# Patient Record
Sex: FEMALE | Race: WHITE | Employment: OTHER | URBAN - METROPOLITAN AREA
[De-identification: names, ages, dates, MRNs, and addresses within clinical notes are randomized per-mention and may not be internally consistent; named-entity substitution may affect disease eponyms.]

---

## 2017-09-05 ENCOUNTER — HOSPITAL ENCOUNTER (OUTPATIENT)
Dept: PHYSICAL THERAPY | Age: 72
Discharge: HOME | End: 2017-09-05
Payer: MEDICARE

## 2017-09-05 PROCEDURE — 97162 PT EVAL MOD COMPLEX 30 MIN: CPT

## 2017-09-05 PROCEDURE — G8979 MOBILITY GOAL STATUS: HCPCS

## 2017-09-05 PROCEDURE — 97110 THERAPEUTIC EXERCISES: CPT

## 2017-09-05 PROCEDURE — G8978 MOBILITY CURRENT STATUS: HCPCS

## 2017-09-05 NOTE — PROGRESS NOTES
PT NEURO EVAL AND TREATMENT    Patient Name: Hoang Saunders  Date:2017  : 1945  [x]  Patient  Verified  Payor: Dustin Coronel / Plan: [place] B / Product Type: Medicare /    In time:1250  Out time:150  Total Treatment Time (min): 60  Total Timed Codes (min): 30  1:1 Treatment Time ([facility] only): 60   Visit #: 1 of 10    Treatment Area: Multiple sclerosis [G35]    SUBJECTIVE  Pain Level (0-10 on visual analog scale): 0  Any medication changes, allergies to medications, diagnosis change, or new procedure performed: see summary sheet for update  Subjective functional status/changes  CHIEF COMPLAINT: Pt c/o LLE weakness and balance deficits secondary to MS.   DEFICITS: difficulty walking without assist; sit-stands  OBJECTIVE:   Posture/Observations: flexed trunk, hips, knees; right trunk lean; dependent rubor right foot; BLE genu valgum  Palpation warmth to touch right foot/digits   Gait: antalgic with RLE trunk lean and left LE AFO/SBQC on level surface with discontinuous steps     AROM/PROM BLEs grossly WFLs except for LLE AROM DF which was -5 degrees    ROM:                             AROM    PROM   Shoulder Left Right Left Right   Flex       Ext       ABD       ER       IR                AROM    PROM   Knee Left Right Left Right   Ext       Flex                 AROM                           PROM  Hip Left Right Left Right   Flex       Ext       ABD       ER       IR                                                AROM      PROM   Ankle Left Right Left Right   Ext       Flex         Strength (MMT):  Shoulder L (1-5) R (1-5)   Shoulder Flexion     Shoulder Ext     Shoulder ABD     Shoulder ADD     Shoulder IR     Shoulder ER                                               Hip L (1-5) R (1-5)   Hip Flexion 3+ 4-   Hip Ext 3- 3-   Hip ABD 3+ 4-   Hip ADD     Hip ER     Hip IR       Knee L (1-5) R (1-5)   Knee Flexion 3+ 3+   Knee Extension 4- 4-   Ankle PF 4 4+   Ankle DF 3- 4-   Other Sensation: grossly intact throughout BLEs  Light Touch  Proprioception    Reflexes: unable to assess Babinski secondary to \"ticklish\"   Left Right   Biceps (C5)     Brachioradiais (C6)     Triceps (C7)     Knee Jerk (L4)     Ankle Jerk (SI)       Balance/ Equilibrium:         Sitting Balance: Static:  [x] Good    [] Fair    [] Poor     Dynamic:   [x] Good    [] Fair    [] Poor        Standing Balance: Static:   [] Good    [x] Fair    [] Poor     Dynamic:   [] Good    [x] Fair    [] Poor        Protective Extension:  [] Present    [x] Delayed    [] Absent        Single Leg Stance:    Unable to determine    Functional Mobility      Bed Mobility:      Scooting:        Rolling:       Sit-Supine:      Transfers:       Sit-Stand: 19in surface takes 2 attempts with BUE       Floor-Stand:       Gait:       Tandem:       Backwards:       Braiding:      Elevations:       Curbs:      Ramps:      Stairs:  Behavior: [] Cooperative    [] Impulsive    [] Agitated    [] Perseverative    [] Confused   Oriented x:3    Cognition: [] One Step Commands   [] Multiple Commands   [] Displays Neglect [] R  [] L  Optional Tests:        Dynamic Gait Index (24pt scale):        Functional Gait Assessment (30pt scale):       Amos Balance Scale (56pt scale):        Timed Up and Go:       TINETTI for Gait and Balance: 16/28    Other test /comments: ABC balance score 26%    Modality (rationale):   []  E-Stim: type _ x _ min     []att   []unatt   []w/US   []w/ice   []w/heat  []  Traction: []cerv   []pelvic   _ lbs x _ min     []pro   []sup   []int   []const  []  Ultrasound: []cont   []pulse    _ W/cm2 x _  min   []1MHz   []3MHz  []  Iontophoresis: []take home patch w/ dexamethazone    []40mA   []80mA                               []_ mA min w/: []dexamethazone   []other:_  []  Ice pack _  min     [] Hot pack _  min     [] Paraffin _  min  []  Other:     Patient Education: [x] Established HEP / POT  (minutes) : 30min HEP administered    Pain Level (0-10 scale) post treatment: 0    ASSESSMENT  [x]  See Plan of Care    PLAN  [x]  Upgrade activities as tolerated     [x] Other:_    See POC for Frequency/duration of visits     Justification for Eval Code Complexity:   Patient History : lives with  in 2 story home with 4 CRISTIANA; MS; OA; depression; L-S surgeryx2  Examination: (see exam)  Clinical Presentation: fluctuating  Clinical Decision Making : FOTO : 36 /100     G-Codes (GP): Mobility:  Current  CL= 60-79%   Goal  CK= 40-59%. The severity rating is based on the [address] Jose Luis Adams, PT 9/5/2017  2:31 PM

## 2017-09-05 NOTE — PROGRESS NOTES
[address] [facility] AT [facility]  [address] [place] [address],[address], Rayna almanza, April 71  Phone [phone]  Fax [phone] / [address] [address] PHYSICAL THERAPY SERVICES  Patient Name: Hoang Saunders : 1945   Medical   Diagnosis: Multiple sclerosis [G35] Treatment Diagnosis: Muscle weakness   Onset Date:      Referral Source: Megan Dye MD [facility]): 2017   Prior Hospitalization: See medical history Provider #: [id number]   Prior Level of Function: Able to get off a chair easier; able to ambulate more easily with use of AFO and SBQC   Comorbidities: MS; latex allergy; OA; depression; CTS and resting hand splint on right hand   Medications: Verified on Patient Summary List   The Plan of Care and following information is based on the information from the initial evaluation.   ===========================================================================================  Assessment / key information:  Patient is a 81 y.o. female who presents to [facility] with a diagnosis of Multiple sclerosis [G35]. Pt presents with her  and ambulates with LLE AFO and SBQC. She has a history of falls the most recent being 6-12 months ago. Pt said her weak side is the left but she tends to fall backwards. She uses a SBQC in the home to ambulate and in the community. In addition, she uses a rollator walker in the community when ambulating long distances. She is her own historian and said she follows up with the neurologist regularly. Her AFO \"is not that old\". Pt denies it cutting into her skin/rashes/irritation with use of brace. She is unable to recall when she purchased it. She dons LLE compression stocking. She lives in a 2 story home with her  with 4 CRISTIANA. She negotiates them with step to pattern and Bilateral UE assist on the right railing ascending and left descending.  She has a tub and tub bench, grab bars, raised toilet, and her master bedroom is on the first floor. AROM throughout BLEs grossly intact except for LLE active DF which is -5 degrees and 0 degrees passively. + RLE dependent rubor (pt was instructed to follow up with PCP and possible cardiologist regarding such). Postural impairments are as follows: BLE genu valgum and flexed trunk with right side lean. + dysmetria and dysdiadochokinesia BLEs. BLEs grossly intact to proprioception and light touch.+ Romberg. TINETTI 16/28 and ABC balance is 26% indicating high risk of falls. + 90-90BLE and +FABERs BLE. Pt unable to stand from a 19in surface without 2+ attempts and BUE support. Functional Deficits include: difficulty getting off chairs; ambulating any length of distance \"in the open without furniture\". Patient's FOTO score was a 36/100 indicating decreased function. Patient's Goal: get off a chair better and walk better. A HEP was initiated to assist with POC in restoring function.  Patient will benefit from a POC addressing such impairments and limitations in order to improve quality of life and return to PLOF.    ==================================================================================Eval Complexity: History: HIGH Complexity :3+ comorbidities / personal factors will impact the outcome/ POC Exam:HIGH Complexity : 4+ Standardized tests and measures addressing body structure, function, activity limitation and / or participation in recreation  Presentation: MEDIUM Complexity : Evolving with changing characteristics  Clinical Decision Making:MEDIUM Complexity : FOTO score of 26-74Overall Complexity:MEDIUM  Problem List: pain affecting function, decrease ROM, decrease strength, impaired gait/ balance, decrease ADL/ functional abilitiies, decrease activity tolerance, decrease flexibility/ joint mobility and decrease transfer abilities   Treatment Plan may include any combination of the following: Therapeutic exercise, Therapeutic activities, Neuromuscular re-education, Physical agent/modality, Gait/balance training, Manual therapy, Patient education, Self Care training, Functional mobility training, Home safety training and Stair training  Patient / Family readiness to learn indicated by: asking questions, trying to perform skills and interest  Persons(s) to be included in education: patient (P)  Barriers to Learning/Limitations: None  Measures taken:    Patient Goal (s): Get off a chair better and walk better   Patient self reported health status: fair  Rehabilitation Potential: good   Short Term Goals: To be accomplished in  2  treatments:  1. Pt will be compliant with HEP for symptom management at home.  Long Term Goals: To be accomplished in  10  treatments:  1. Pt will demonstrate an increased FOTO score to 50/100 in order to improve function  2. Pt will be independent with HEP at D/C for self management. 3. Pt will be able to negotiate 4, 6in stairs with step to gait with 2 railings and without LOB in order to safely enter/exit her home  4. Pt will be able to ambulate 5min continuously with use of LLE AFO/SBQC and continuous gait without LOB in order to negotiate her home and community with increased ease  5. Pt will be able to stand from a 19in surface with use of unilateral UE support on one try in order to improve function  6. Pt will improve TINETTI score to >18/28 in order to decrease risk of future falls  7. Pt will improve ABC balance score by 10% in order to decrease future risk of falls     Frequency / Duration:     Patient to be seen  2  times per week for 10  treatments:  Patient / Caregiver education and instruction: activity modification and exercises  G-Codes (GP): Mobility: [id number] Current  CL= 60-79%   Goal  CK= 40-59%. The severity rating is based on the FOTO Score    Therapist Signature: Anabella Marrero PT Date: 3/3/1873   Certification Period: 90 days Time: 2:47 PM ===========================================================================================  I certify that the above Physical Therapy Services are being furnished while the patient is under my care. I agree with the treatment plan and certify that this therapy is necessary. Physician Signature:        Date:       Time:     Please sign and return to [facility] or you may fax the signed copy to [phone]. Thank you.

## 2017-09-13 ENCOUNTER — HOSPITAL ENCOUNTER (OUTPATIENT)
Dept: PHYSICAL THERAPY | Age: 72
Discharge: HOME | End: 2017-09-13
Payer: MEDICARE

## 2017-09-13 PROCEDURE — 97110 THERAPEUTIC EXERCISES: CPT

## 2017-09-13 PROCEDURE — 97140 MANUAL THERAPY 1/> REGIONS: CPT

## 2017-09-13 NOTE — PROGRESS NOTES
PT DAILY TREATMENT NOTE - [facility]     Patient Name: Josue Guillen  Date:2017  : 1945  [x]  Patient  Verified  Payor: Flori Holley / Plan: [place] MEDICARE PART B / Product Type: Medicare /    In time: 1:30  Out time: 2:13  Total Treatment Time (min): 43  Total Timed Codes (min): 43  1:1 Treatment Time ( [address] only): 43   Visit #: 2 of 10    Treatment Area: Multiple sclerosis [G35], Muscle weakness [M62.81]    SUBJECTIVE  Pain Level (0-10 scale): 0  Any medication changes, allergies to medications, adverse drug reactions, diagnosis change, or new procedure performed?: [x] No    [] Yes (see summary sheet for update)  Subjective functional status/changes:   [] No changes reported  Pt reports transient b/l knee pain as per baseline. States she has been trying to do her HEP, but having trouble remembering to get them done. OBJECTIVE    35 min Therapeutic Exercise:  [x] See flow sheet :   Rationale: increase ROM, increase strength, improve balance and increase proprioception to improve the patients ability to perform functional mobility/ADLs and attain goals. 8 min Manual Therapy:  STM to right quad, ITB. Gentle patellar mobs grade 1-2   Rationale: to decrease pain and increase soft tissue extensibility  to improve the patients ability to perform functional mobility/ADLs and attain goals. x min Gait Training:  Ambulates in clinic `50'x2 with supervision and R SBQC during session. Ambulates 15' outdoors clinic to car with supervision. Rationale: With   [] TE   [] TA   [] neuro   [] other: Patient Education: [x] Review HEP    [] Progressed/Changed HEP based on:   [] positioning   [] body mechanics   [] transfers   [] heat/ice application    [] other:      Other Objective/Functional Measures:   -new exercises added as per flow sheet with vc's provided for form/technique 100% of the time.       Pain Level (0-10 scale) post treatment: 0    ASSESSMENT/Changes in Function: Pt with moderate fear of fall with ambulation and sit to stand transfers; \"I just need you to walk with me b/c I'm scared I'll fall\". Pt ed and cued for increased fwd wt shifting during sit to stand transfer to reduce muscle fatigue and strain which she demonstrates well, however glute weakness as per dynamic genu valgus from high mat table. Pt ed on need to exercise within tolerance and to avoid over-exertion/fatigue during exercises due to MS; she verbalizes understanding and agreement. R>L anterior knee pain a barrier to exercise tolerance today and addressed with manual therapy. Patient will continue to benefit from skilled PT services to modify and progress therapeutic interventions, address functional mobility deficits, address ROM deficits, address strength deficits, analyze and address soft tissue restrictions and analyze and cue movement patterns to attain remaining goals. []  See Plan of Care  []  See progress note/recertification  []  See Discharge Summary         Progress towards goals / Updated goals:  Initiated exercises this session    PLAN  []  Upgrade activities as tolerated     [x]  Continue plan of care  []  Update interventions per flow sheet       []  Discharge due to:_  []  Other:_      [address] [facility] JUDI Chiu, PT 9/13/2017  1:54 PM    Future Appointments  Date Time Provider Cecily Goins   9/15/2017 1:30 PM Katerine Chiu, PT [facility]   9/18/2017 1:30 PM Katerine Winchester, PT [facility]   9/22/2017 1:30 PM Katerine Landa [facility]   9/25/2017 2:00 PM Katerine Landa [facility]   9/29/2017 2:00 PM Katerine Loomis

## 2017-09-15 ENCOUNTER — HOSPITAL ENCOUNTER (OUTPATIENT)
Dept: PHYSICAL THERAPY | Age: 72
Discharge: HOME | End: 2017-09-15
Payer: MEDICARE

## 2017-09-15 PROCEDURE — 97110 THERAPEUTIC EXERCISES: CPT

## 2017-09-15 PROCEDURE — 97116 GAIT TRAINING THERAPY: CPT

## 2017-09-15 NOTE — PROGRESS NOTES
PT DAILY TREATMENT NOTE - [facility]     Patient Name: Rody Briggs  Date:9/15/2017  : 1945  [x]  Patient  Verified  Payor: Gerardo Perdue / Plan: [place] MEDICARE PART B / Product Type: Medicare /    In time: 1:30  Out time: 2:20  Total Treatment Time (min): 50  Total Timed Codes (min): 46  1:1 Treatment Time ([address] only): 55   Visit #: 3 of 10    Treatment Area: Multiple sclerosis [G35], Muscle weakness [M62.81]    SUBJECTIVE  Pain Level (0-10 scale): 3 (right knee)  Any medication changes, allergies to medications, adverse drug reactions, diagnosis change, or new procedure performed?: [x] No    [] Yes (see summary sheet for update)  Subjective functional status/changes:   [] No changes reported  Pt reports difficulty performing bridges for HEP due to weakness. States she \"felt fine\" after last session. OBJECTIVE    38 min Therapeutic Exercise:  [x] See flow sheet :(-4 min bike)   Rationale: increase ROM, increase strength, improve balance and increase proprioception to improve the patients ability to perform functional mobility/ADLs and attain goals. ND min Manual Therapy:  STM to right quad, ITB. Gentle patellar mobs grade 1-2   Rationale: to decrease pain and increase soft tissue extensibility  to improve the patients ability to perform functional mobility/ADLs and attain goals. 8 min Gait Training:  Ambulates in clinic `50'x2 with supervision and R SBQC during session. Ambulates 15'x2 outdoors clinic to car with supervision.  -Ambulates over 3 small hurdles with SBQC and CG/min assist, non-reciprocally Right and left. Rationale: To increase safety and independence with ambulation with least restrictive AD and decreased fall risk.      With   [] TE   [] TA   [] neuro   [] other: Patient Education: [x] Review HEP    [] Progressed/Changed HEP based on:   [] positioning   [] body mechanics   [] transfers   [] heat/ice application    [] other:      Other Objective/Functional Measures:   -added 3 way hip, recumbent bike    Pain Level (0-10 scale) post treatment: 3    ASSESSMENT/Changes in Function: Increased ankle strategy and sway noted during static balance activities. Step ups, sit to stands and hurdles limited occasionally by c/o R knee pain. Pt unable to clear RLE hurdles when leading with LLE without circumduction due to c/o knee pain. Sit to stands ~75% wb'ing RLE and requires vc's to increase LLE wb'ing; \"It's hard b/c it's weak\". Will require further skilled progression to attain goals set. Patient will continue to benefit from skilled PT services to modify and progress therapeutic interventions, address functional mobility deficits, address ROM deficits, address strength deficits, analyze and address soft tissue restrictions and analyze and cue movement patterns to attain remaining goals. []  See Plan of Care  []  See progress note/recertification  []  See Discharge Summary         Progress towards goals / Updated goals:  Current goals in progress    PLAN  []  Upgrade activities as tolerated     [x]  Continue plan of care  []  Update interventions per flow sheet       []  Discharge due to:_  []  Other:_      Jennie Chiu, PT 9/15/2017  1:54 PM    Future Appointments  Date Time Provider Cecily Goins   9/18/2017 1:30 PM Katerine Beckford, PT [facility]   9/22/2017 1:30 PM Katerine Schwartz [facility]   9/25/2017 2:00 PM Katerine Schwartz [facility]   9/29/2017 2:00 PM Katerine Cortez

## 2017-09-18 ENCOUNTER — HOSPITAL ENCOUNTER (OUTPATIENT)
Dept: PHYSICAL THERAPY | Age: 72
Discharge: HOME | End: 2017-09-18
Payer: MEDICARE

## 2017-09-18 PROCEDURE — 97112 NEUROMUSCULAR REEDUCATION: CPT

## 2017-09-18 PROCEDURE — 97110 THERAPEUTIC EXERCISES: CPT

## 2017-09-18 NOTE — PROGRESS NOTES
PT DAILY TREATMENT NOTE - [facility]     Patient Name: Caryn Ramirez  Date:2017  : 1945  [x]  Patient  Verified  Payor: Waqar Alvarado / Plan: [place] MEDICARE PART B / Product Type: Medicare /    In time: 1:28  Out time: 2:10  Total Treatment Time (min): 42  Total Timed Codes (min): 42  1:1 Treatment Time ( [address] only): 32   Visit #: 4 of 10    Treatment Area: Multiple sclerosis [G35], Muscle weakness [M62.81]    SUBJECTIVE  Pain Level (0-10 scale): 0   Any medication changes, allergies to medications, adverse drug reactions, diagnosis change, or new procedure performed?: [x] No    [] Yes (see summary sheet for update)  Subjective functional status/changes:   [] No changes reported  \"I was really struggling with my balance today. I almost fell over like 3 times. \"      OBJECTIVE    22  (32) min Therapeutic Exercise:  [x] See flow sheet :   Rationale: increase ROM, increase strength, improve balance and increase proprioception to improve the patients ability to perform functional mobility/ADLs and attain goals. 10 min Neuromuscular Re-education:  [x]  See flow sheet :  Static and dynamic standing balance activities   Rationale: improve coordination, improve balance and increase proprioception  to improve the patients ability to perform functional mobility/ADLs and attain goals. With   [] TE   [] TA   [] neuro   [] other: Patient Education: [x] Review HEP    [] Progressed/Changed HEP based on:   [] positioning   [] body mechanics   [] transfers   [] heat/ice application    [] other:      Other Objective/Functional Measures:   -progressed to 6 inch step ups today    Pain Level (0-10 scale) post treatment: 0    ASSESSMENT/Changes in Function: Improving form and independence with sit to/from stand transfers from raised mat today. Pt utilizing increased wb'ing to LLE with transfers with min cues.   Pt demonstrating improved stability with ruddy negotiation, no LOB, however requires CG and cues for safety. Patient will continue to benefit from skilled PT services to modify and progress therapeutic interventions, address functional mobility deficits, address ROM deficits, address strength deficits, analyze and address soft tissue restrictions and analyze and cue movement patterns to attain remaining goals. []  See Plan of Care  []  See progress note/recertification  []  See Discharge Summary         Progress towards goals / Updated goals: · Short Term Goals: To be accomplished in  2  treatments:  1. Pt will be compliant with HEP for symptom management at home.-9/18: In progress; issued balance handout HEP this session     · Long Term Goals: To be accomplished in  10  treatments:  1. Pt will demonstrate an increased FOTO score to 50/100 in order to improve function  2. Pt will be independent with HEP at D/C for self management. 3. Pt will be able to negotiate 4, 6in stairs with step to gait with 2 railings and without LOB in order to safely enter/exit her home .-9/18: In progress; progressed to 6 inch step ups fwd and side to left today. 4. Pt will be able to ambulate 5min continuously with use of LLE AFO/SBQC and continuous gait without LOB in order to negotiate her home and community with increased ease  5. Pt will be able to stand from a 19in surface with use of unilateral UE support on one try in order to improve function -9/18: In progress; sit to stands from raised mat with 0 UE support 2x5 reps  6. Pt will improve TINETTI score to >18/28 in order to decrease risk of future falls  7. Pt will improve ABC balance score by 10% in order to decrease future risk of falls     PLAN  []  Upgrade activities as tolerated     [x]  Continue plan of care  []  Update interventions per flow sheet       []  Discharge due to:_  []  Other:_      Arnel Chiu, PT 9/18/2017  1:54 PM    Future Appointments  Date Time Provider Cecily Goins   9/22/2017 1:30 PM Katerine Moncada  [facility]   9/25/2017 2:00 PM Katerine ARVIZU [facility]   9/29/2017 2:00 PM Katerine Casper

## 2017-09-22 ENCOUNTER — HOSPITAL ENCOUNTER (OUTPATIENT)
Dept: PHYSICAL THERAPY | Age: 72
Discharge: HOME | End: 2017-09-22
Payer: MEDICARE

## 2017-09-22 PROCEDURE — 97110 THERAPEUTIC EXERCISES: CPT

## 2017-09-22 PROCEDURE — 97112 NEUROMUSCULAR REEDUCATION: CPT

## 2017-09-22 PROCEDURE — 97116 GAIT TRAINING THERAPY: CPT

## 2017-09-22 NOTE — PROGRESS NOTES
PT DAILY TREATMENT NOTE - [facility]     Patient Name: Zahraa Walker  Date:2017  : 1945  [x]  Patient  Verified  Payor: Thang Mares / Plan: [place] MED[place] PART B / Product Type: Medicare /    In time: 2:35  Out time: 3:20  Total Treatment Time (min): 45  Total Timed Codes (min): 45  1:1 Treatment Time ( [address] only): 45   Visit #: 5 of 10    Treatment Area: Multiple sclerosis [G35]    SUBJECTIVE  Pain Level (0-10 scale): 0  Any medication changes, allergies to medications, adverse drug reactions, diagnosis change, or new procedure performed?: [x] No    [] Yes (see summary sheet for update)  Subjective functional status/changes:   [] No changes reported  \"I couldn't get from the handicap parking spot of [employer] into the store so I had to ask someone to get me a cart\". OBJECTIVE    15 min Therapeutic Exercise:  [x] See flow sheet :   Rationale: increase ROM and increase strength to improve the patients ability to perform functional mobility/ADLs and attain goals. 22 min Neuromuscular Re-education:  [x]  See flow sheet :   Rationale: improve coordination, improve balance and increase proprioception  to improve the patients ability to perform functional mobility/ADLs and attain goals. 8 min Gait Training:  _300__ feet with _LBQC__ device outdoors on sidewalk/parking lot  with _supervision__ level of assist; up/down curb with [facility] and supervision. Rationale: To increase safety and independence with ambulation with least restrictive AD and decreased fall risk.        With   [] TE   [] TA   [] neuro   [] other: Patient Education: [x] Review HEP    [] Progressed/Changed HEP based on:   [] positioning   [] body mechanics   [] transfers   [] heat/ice application    [] other:      Other Objective/Functional Measures:   -advanced foot positions with static standing balance as per flow sheet  -added gait training outdoors to improve community mobility     Pain Level (0-10 scale) post treatment: 0    ASSESSMENT/Changes in Function: Pt demonstrates increasing proprioception and balance stability as per ability to advance foot positions with standing balance exercises today. Pt continues to be hesitant and FOF with ambulation outdoors, despite demonstrating good- balance and no need for CGA. She states this is from previous h/o LLE buckling unexpectedly leading to falls in the past.  Will continue to progress with close monitoring of sx's to achieve goals set. Patient will continue to benefit from skilled PT services to modify and progress therapeutic interventions, address functional mobility deficits, address ROM deficits, address strength deficits, analyze and address soft tissue restrictions, analyze and cue movement patterns and address imbalance/dizziness to attain remaining goals. []  See Plan of Care  []  See progress note/recertification  []  See Discharge Summary         Progress towards goals / Updated goals: · Short Term Goals: To be accomplished in  2  treatments:  1. Pt will be compliant with HEP for symptom management at home.-9/22: In progress; pt reports compliance.      · Long Term Goals: To be accomplished in  10  treatments:  1. Pt will demonstrate an increased FOTO score to 50/100 in order to improve function  2. Pt will be independent with HEP at D/C for self management. 3. Pt will be able to negotiate 4, 6in stairs with step to gait with 2 railings and without LOB in order to safely enter/exit her home .-9/18: In progress; progressed to 6 inch step ups fwd and side to left today. 4. Pt will be able to ambulate 5min continuously with use of LLE AFO/SBQC and continuous gait without LOB in order to negotiate her home and community with increased ease  5. Pt will be able to stand from a 19in surface with use of unilateral UE support on one try in order to improve function -9/18: In progress; sit to stands from raised mat with 0 UE support 2x5 reps  6.  Pt will improve TINETTI score to >18/28 in order to decrease risk of future falls  7. Pt will improve ABC balance score by 10% in order to decrease future risk of falls     PLAN  [x]  Upgrade activities as tolerated     [x]  Continue plan of care  []  Update interventions per flow sheet       []  Discharge due to:_  []  Other:_      Jennie Chiu, PT 9/22/2017  2:50 PM    Future Appointments  Date Time Provider Cecily Goins   9/25/2017 2:00 PM Katerine Schwartz [facility]   9/29/2017 2:00 PM Katerine Schwartz [facility]   10/2/2017 2:30 PM Alexander Johnson DPT [facility]   10/6/2017 2:00 PM Alexander Johnson DPT [facility]   10/9/2017 2:30 PM Alexander Johnson DPT [facility]   10/13/2017 2:00 PM Alexander Johnson DPT [facility]

## 2017-09-25 ENCOUNTER — APPOINTMENT (OUTPATIENT)
Dept: PHYSICAL THERAPY | Age: 72
End: 2017-09-25
Payer: MEDICARE

## 2017-09-27 ENCOUNTER — HOSPITAL ENCOUNTER (OUTPATIENT)
Dept: PHYSICAL THERAPY | Age: 72
Discharge: HOME | End: 2017-09-27
Payer: MEDICARE

## 2017-09-27 PROCEDURE — 97116 GAIT TRAINING THERAPY: CPT

## 2017-09-27 PROCEDURE — 97110 THERAPEUTIC EXERCISES: CPT

## 2017-09-27 PROCEDURE — 97112 NEUROMUSCULAR REEDUCATION: CPT

## 2017-09-27 NOTE — PROGRESS NOTES
PT DAILY TREATMENT NOTE - [facility]     Patient Name: Sharla Gee  Date:2017  : 1945  [x]  Patient  Verified  Payor: Cesar Briggs / Plan: [place] MED[place]ARE PART B / Product Type: Medicare /    In time: 2:30  Out time:3:30  Total Treatment Time (min): 60  Total Timed Codes (min): 60  1:1 Treatment Time ( [address] only): 60   Visit #: 6 of 10    Treatment Area: Multiple sclerosis [G35]    SUBJECTIVE  Pain Level (0-10 scale): 1- right hip  Any medication changes, allergies to medications, adverse drug reactions, diagnosis change, or new procedure performed?: [x] No    [] Yes (see summary sheet for update)  Subjective functional status/changes:   [] No changes reported  Pt states her Right hip bursitis flared up Monday and she was unable to attend PT. She completed an old HEP for right hip and feels that her pain is improved today. She tries to lean forward more when standing up from her toilet and notices improved ease. OBJECTIVE      15 min Therapeutic Exercise:  [x] See flow sheet :   Rationale: increase ROM and increase strength to improve the patients ability to perform functional mobility/ADLs and attain goals. 30 min Neuromuscular Re-education:  [x]  See flow sheet :   Rationale: improve coordination, improve balance and increase proprioception  to improve the patients ability to perform functional mobility/ADLs and attain goals. 15 min Gait Training:  _300__ feet with _SBQC__ device on outdoor surfaces with _supervision__ level of assist.    -up/down 4 steps with 1-2 rails non-reciprocally with modified independence. Rationale:           With   [] TE   [] TA   [] neuro   [] other: Patient Education: [x] Review HEP    [] Progressed/Changed HEP based on:   [] positioning   [] body mechanics   [] transfers   [] heat/ice application    [] other:      Other Objective/Functional Measures:   -increased reps with with H/L abd   -FOTO= 40    Pain Level (0-10 scale) post treatment: 1    ASSESSMENT/Changes in Function: Pt demonstrates improving standing balance reactions as per increased stance time with eyes closed on foam.  Continues to have fear of fall with outdoor/indoor ambulation when not next to a wall or other surface requiring supervision; gait training performed outdoors in more open environment to promote improved confidence and reinforce safe gait pattern with ambulation over uneven terrain. Sit to stand from raised surface requires min cueing for sufficient fwd wt shift with COG for rising and lowering. Patient will continue to benefit from skilled PT services to modify and progress therapeutic interventions, address functional mobility deficits, address strength deficits and address imbalance/dizziness to attain remaining goals. []  See Plan of Care  []  See progress note/recertification  []  See Discharge Summary         Progress towards goals / Updated goals: · Short Term Goals: To be accomplished in  2  treatments:  1. Pt will be compliant with HEP for symptom management at home.-9/22: In progress; pt reports compliance.      · Long Term Goals: To be accomplished in  10  treatments:  1. Pt will demonstrate an increased FOTO score to 50/100 in order to improve function. -9/27: Goal in progress: FOTO= 40 (+4 pt improvement)  2. Pt will be independent with HEP at D/C for self management. 3. Pt will be able to negotiate 4, 6in stairs with step to gait with 2 railings and without LOB in order to safely enter/exit her home .-9/27: GOAL MET; up/down 4 steps with 1-2 rail modified independently, non-reciprocal gait pattern. 4. Pt will be able to ambulate 5min continuously with use of LLE AFO/SBQC and continuous gait without LOB in order to negotiate her home and community with increased ease.-9/27: GOAL in progress; supervision to ambulate 300' outdoors (see gait)  5.  Pt will be able to stand from a 19in surface with use of unilateral UE support on one try in order to improve function -9/27: Partially Met; sit to stand from 19 inch surface, one UE, one attempt; slow and effortful with increased min increase knee pain. 6. Pt will improve TINETTI score to >18/28 in order to decrease risk of future falls  7. Pt will improve ABC balance score by 10% in order to decrease future risk of falls     PLAN  []  Upgrade activities as tolerated     []  Continue plan of care  []  Update interventions per flow sheet       []  Discharge due to:_  []  Other:_      Ros Chiu, PT 9/27/2017  2:35 PM    Future Appointments  Date Time Provider Cecily Goins   9/29/2017 2:00 PM Katerine Varma, PT [facility]   10/2/2017 2:30 PM Promise Espana, PT [facility]   10/6/2017 2:00 PM [facility] [place] [facility]   10/9/2017 2:30 PM [facility] [place] [facility]   10/13/2017 2:30 PM Promise Espana, PT [facility]

## 2017-09-29 ENCOUNTER — HOSPITAL ENCOUNTER (OUTPATIENT)
Dept: PHYSICAL THERAPY | Age: 72
Discharge: HOME | End: 2017-09-29
Payer: MEDICARE

## 2017-09-29 PROCEDURE — 97116 GAIT TRAINING THERAPY: CPT

## 2017-09-29 PROCEDURE — 97112 NEUROMUSCULAR REEDUCATION: CPT

## 2017-09-29 NOTE — PROGRESS NOTES
PT DAILY TREATMENT NOTE - [facility]     Patient Name: Jenny Lockhart  Date:2017  : 1945  [x]  Patient  Verified  Payor: Azucena Franco / Plan: [place] MEDICARE PART B / Product Type: Medicare /    In time: 2:03  Out time: 2:45  Total Treatment Time (min): 42  Total Timed Codes (min): 42  1:1 Treatment Time ( [address] only): 27  Visit #: 7 of 10    Treatment Area: Multiple sclerosis [G35]    SUBJECTIVE  Pain Level (0-10 scale): 1- right hip  Any medication changes, allergies to medications, adverse drug reactions, diagnosis change, or new procedure performed?: [x] No    [] Yes (see summary sheet for update)  Subjective functional status/changes:   [x] No changes reported      OBJECTIVE      (15) min Therapeutic Exercise:  [x] See flow sheet :   Rationale: increase ROM and increase strength to improve the patients ability to perform functional mobility/ADLs and attain goals. 19 min Neuromuscular Re-education:  [x]  See flow sheet :   Rationale: improve coordination, improve balance and increase proprioception  to improve the patients ability to perform functional mobility/ADLs and attain goals. 8 min Gait Training:  _300__ feet with _SBQC__ device on outdoor surfaces with _supervision__ level of assist.     Rationale: With   [] TE   [] TA   [] neuro   [] other: Patient Education: [x] Review HEP    [] Progressed/Changed HEP based on:   [] positioning   [] body mechanics   [] transfers   [] heat/ice application    [] other:      Other Objective/Functional Measures:   -lowered mat height for sit to stands and reduced use of 1 UE support noted (occasionally stands without use of UE)    Pain Level (0-10 scale) post treatment: 1    ASSESSMENT/Changes in Function:  Pt demonstrates slowly progressing standing balance and LE strength as per ability to improve independence with sit to stands from mat table.   Continues to require supervision with outdoor ambulation due to fear of fall, however she is ambulating further away from walls as she demonstrates improved confidence and states she feels more steady. Right knee pain limits tolerance for standing 3 way hip today (no brace on today; forgot at home). Patient will continue to benefit from skilled PT services to modify and progress therapeutic interventions, address functional mobility deficits, address strength deficits and address imbalance/dizziness to attain remaining goals. []  See Plan of Care  []  See progress note/recertification  []  See Discharge Summary         Progress towards goals / Updated goals: · Short Term Goals: To be accomplished in  2  treatments:  1. Pt will be compliant with HEP for symptom management at home.-9/22: In progress; pt reports compliance.      · Long Term Goals: To be accomplished in  10  treatments:  1. Pt will demonstrate an increased FOTO score to 50/100 in order to improve function. -9/27: Goal in progress: FOTO= 40 (+4 pt improvement)  2. Pt will be independent with HEP at D/C for self management. 3. Pt will be able to negotiate 4, 6in stairs with step to gait with 2 railings and without LOB in order to safely enter/exit her home .-9/27: GOAL MET; up/down 4 steps with 1-2 rail modified independently, non-reciprocal gait pattern. 4. Pt will be able to ambulate 5min continuously with use of LLE AFO/SBQC and continuous gait without LOB in order to negotiate her home and community with increased ease.-9/29: GOAL in progress; supervision to ambulate 300' outdoors (see gait)  5. Pt will be able to stand from a 19in surface with use of unilateral UE support on one try in order to improve function -9/29: Partially Met; sit to stand from 20 inch surface, one UE, x 0 reps; slow and effortful with increased min increase knee pain. 6. Pt will improve TINETTI score to >18/28 in order to decrease risk of future falls  7.  Pt will improve ABC balance score by 10% in order to decrease future risk of falls     PLAN  [x] Upgrade activities as tolerated     [x]  Continue plan of care  []  Update interventions per flow sheet       []  Discharge due to:_  []  Other:_      Brandy Chiu, PT 9/29/2017  2:35 PM    Future Appointments  Date Time Provider Cecily Goins   10/2/2017 2:30 PM Valery Ochoa, PT [facility]   10/6/2017 2:00 PM [facility] [place] [facility]   10/9/2017 2:30 PM [facility] [place] [facility]   10/11/2017 2:00 PM Valery Ochoa, PT [facility]   10/16/2017 3:00 PM Camie Jeans, DPT [facility]   10/23/2017 2:30 PM Camie Jeans, DPT [facility]   10/27/2017 2:30 PM Camie Jeans, DPT [facility]   10/30/2017 2:30 PM Camie Jeans, DPT [facility]

## 2017-10-02 ENCOUNTER — HOSPITAL ENCOUNTER (OUTPATIENT)
Dept: PHYSICAL THERAPY | Age: 72
Discharge: HOME | End: 2017-10-02
Payer: MEDICARE

## 2017-10-02 PROCEDURE — 97112 NEUROMUSCULAR REEDUCATION: CPT | Performed by: PHYSICAL THERAPIST

## 2017-10-02 PROCEDURE — 97116 GAIT TRAINING THERAPY: CPT | Performed by: PHYSICAL THERAPIST

## 2017-10-02 PROCEDURE — 97110 THERAPEUTIC EXERCISES: CPT | Performed by: PHYSICAL THERAPIST

## 2017-10-02 NOTE — PROGRESS NOTES
PT DAILY TREATMENT NOTE - [facility]     Patient Name: Attila Chaidez  Date:10/2/2017  : 1945  [x]  Patient  Verified  Payor: Jarett Arellano / Plan: [place] MEDICARE PART B / Product Type: Medicare /    In time: 80  Out time: 325  Total Treatment Time (min): 53  Total Timed Codes (min): 53  1:1 Treatment Time ( only): 48  Visit #: 8 of 10    Treatment Area: Multiple sclerosis [G35]    SUBJECTIVE  Pain Level (0-10 scale): 3- right knee  Any medication changes, allergies to medications, adverse drug reactions, diagnosis change, or new procedure performed?: [x] No    [] Yes (see summary sheet for update)  Subjective functional status/changes:   [x] No changes reported      OBJECTIVE      18/13 min Therapeutic Exercise:  [x] See flow sheet :   Rationale: increase ROM and increase strength to improve the patients ability to perform functional mobility/ADLs and attain goals. 15 min Neuromuscular Re-education:  [x]  See flow sheet :   Rationale: improve coordination, improve balance and increase proprioception  to improve the patients ability to perform functional mobility/ADLs and attain goals. 15 min Gait Training:  _300__ feet with _SBQC__ device on outdoor surfaces with _supervision__ level of assist. , hurdles (fwd), side walking    Rationale: With   [] TE   [] TA   [] neuro   [] other: Patient Education: [x] Review HEP    [] Progressed/Changed HEP based on:   [] positioning   [] body mechanics   [] transfers   [] heat/ice application    [] other:      Other Objective/Functional Measures:   -lowered mat height for sit to stands and reduced use of 1 UE support noted (occasionally stands without use of UE), progressed to EC on foam with ROM and MSR  With increased post sway. Hurdles with SBA with cues for increased hip flexion, cues with side walking to bring cane in closer to feet. Increased ZEKE needed for all ambulation activities.      Pain Level (0-10 scale) post treatment: by 10% in order to decrease future risk of falls     PLAN  [x]  Upgrade activities as tolerated     [x]  Continue plan of care  []  Update interventions per flow sheet       []  Discharge due to:_  []  Other:_      Angel Richardson, PT 10/2/2017  2:35 PM    Future Appointments  Date Time Provider Cecily Goins   10/2/2017 2:30 PM Angel Richardson, PT [facility]   10/6/2017 2:00 PM [facility] [place] [facility]   10/9/2017 2:30 PM [facility] PT [facility]   10/11/2017 2:00 PM Angel Richardson, PT [facility]   10/16/2017 3:00 PM Peri Belle, DPT [facility]   10/23/2017 2:30 PM Peri Belle, JENN[facility]   10/27/2017 2:30 PM Peri Belle, JENNIT [facility]   10/30/2017 2:30 PM Peri Belle, DPT [facility]

## 2017-10-06 ENCOUNTER — APPOINTMENT (OUTPATIENT)
Dept: PHYSICAL THERAPY | Age: 72
End: 2017-10-06
Payer: MEDICARE

## 2017-10-09 ENCOUNTER — HOSPITAL ENCOUNTER (OUTPATIENT)
Dept: PHYSICAL THERAPY | Age: 72
Discharge: HOME | End: 2017-10-09
Payer: MEDICARE

## 2017-10-09 PROCEDURE — 97530 THERAPEUTIC ACTIVITIES: CPT

## 2017-10-09 PROCEDURE — 97112 NEUROMUSCULAR REEDUCATION: CPT

## 2017-10-09 PROCEDURE — 97116 GAIT TRAINING THERAPY: CPT

## 2017-10-09 PROCEDURE — G8979 MOBILITY GOAL STATUS: HCPCS | Performed by: PHYSICAL THERAPIST

## 2017-10-09 PROCEDURE — 97110 THERAPEUTIC EXERCISES: CPT

## 2017-10-09 PROCEDURE — G8978 MOBILITY CURRENT STATUS: HCPCS | Performed by: PHYSICAL THERAPIST

## 2017-10-09 NOTE — PROGRESS NOTES
PT DAILY TREATMENT NOTE - [facility]     Patient Name: Hermilo Greco  Date:10/9/2017  : 1945  [x]  Patient  Verified  Payor: Jim Aden / Plan: [place] MEDICARE PART B / Product Type: Medicare /    In time: 2:30  Out time: 3:12  Total Treatment Time (min): 42  Total Timed Codes (min): 42  1:1 Treatment Time ( only): 42  Visit #: 9 of 10    Treatment Area: Multiple sclerosis [G35]    SUBJECTIVE  Pain Level (0-10 scale): 5/10 right hip/LB and 3/10 R knee  Any medication changes, allergies to medications, adverse drug reactions, diagnosis change, or new procedure performed?: [x] No    [] Yes (see summary sheet for update)  Subjective functional status/changes:   [x] No changes reported  Pt c/o increased pain in LB over the weekend and R knee pain today. OBJECTIVE      8 min Therapeutic Exercise:  [x] See flow sheet : Recumbent bike, seated hip flex and LAQ. Rationale: increase ROM and increase strength to improve the patients ability to perform functional mobility/ADLs and attain goals. 12 min Neuromuscular Re-education:  [x]  See flow sheet : Tinetti Balance Assessment Tool   Rationale: improve coordination, improve balance and increase proprioception  to improve the patients ability to perform functional mobility/ADLs and attain goals. 10 min Gait Trainin' x 2 with SBQC. See PN     Rationale:    12 min Therapeutic Activity:  Tinetti Balance Assessment Tool & ABC   Rationale:           With   [] TE   [] TA   [] neuro   [] other: Patient Education: [x] Review HEP    [] Progressed/Changed HEP based on:   [] positioning   [] body mechanics   [] transfers   [] heat/ice application    [] other:      Other Objective/Functional Measures:   See PN    Pain Level (0-10 scale) post treatment: 5/10 right hip/LB and 3/10 R knee    ASSESSMENT/Changes in Function:  See PN    Patient will continue to benefit from skilled PT services to modify and progress therapeutic interventions, address functional mobility deficits, address strength deficits and address imbalance/dizziness to attain remaining goals. []  See Plan of Care  []  See progress note/recertification  []  See Discharge Summary         Progress towards goals / Updated goals: · Short Term Goals: To be accomplished in  2  treatments:  1. Pt will be compliant with HEP for symptom management at home.-9/22: In progress; pt reports compliance.      · Long Term Goals: To be accomplished in  10  treatments:  1. Pt will demonstrate an increased FOTO score to 50/100 in order to improve function. -9/27: Goal in progress: FOTO= 40 (+4 pt improvement)  2. Pt will be independent with HEP at D/C for self management. 3. Pt will be able to negotiate 4, 6in stairs with step to gait with 2 railings and without LOB in order to safely enter/exit her home .-9/27: GOAL MET; up/down 4 steps with 1-2 rail modified independently, non-reciprocal gait pattern. 4. Pt will be able to ambulate 5min continuously with use of LLE AFO/SBQC and continuous gait without LOB in order to negotiate her home and community with increased ease.-9/29: GOAL in progress; supervision to ambulate 300' outdoors (see gait)  5. Pt will be able to stand from a 19in surface with use of unilateral UE support on one try in order to improve function -10/9: Goal met. Pt able to stand from 17\" chair with use of 1 UE after instruction in \"nose over toes\" and sitting on edge of chair. 6. Pt will improve TINETTI score to >18/28 in order to decrease risk of future falls  7.  Pt will improve ABC balance score by 10% in order to decrease future risk of falls     PLAN  [x]  Upgrade activities as tolerated     [x]  Continue plan of care  []  Update interventions per flow sheet       []  Discharge due to:_  []  Other:_      Bennett Avalos 10/9/2017  2:35 PM    Future Appointments  Date Time Provider Cecily Goins   10/11/2017 2:00 PM Lauren Rosario, PT [facility]   10/16/2017 3:00 PM Carlos Petar Sands [facility]   10/23/2017 2:30 PM Daya Ortega DPT [facility]   10/27/2017 2:30 PM Daya Ortega DPT [facility]   10/30/2017 2:30 PM Daya Ortega, [facility]

## 2017-10-09 NOTE — PROGRESS NOTES
[address] [facility] AT [facility]  [address] [place] [address],[address], Rayna almanza, April Mercado  Phone [phone]  Fax [phone]  PROGRESS NOTE  Patient Name: Sharla Gee : 1945   Treatment/Medical Diagnosis: Multiple sclerosis [G35]   Referral Source: Stone Hudson MD     Date of Initial Visit: 17 Attended Visits: 9 Missed Visits: 1     SUMMARY OF TREATMENT  Patient has received physical therapy for L UE/LE weakness and falls since 17. Treatment has included therapeutic stretching, strengthen, balance activities, and gait training to assist with decreasing pain and increasing function. CURRENT STATUS  Patient has completed 9 visits  Patient reports no significant improvement since beginning therapy  FOTO (Functional Status Summary)  score is 40/100 (on 17) and was 36/100 initial evaluation - indication of overall functional improvement. LLE V RLE DF AROM/PROM is -5/0 v  currently was the same on L on initial evaluation. Tinetti: - high risk of falls (10/9/17) at eval: 17  Activities- specific balance confidence scale: 26% 10/9/17 at eval: 26%     Patient's remaining chief c/o is feeling off balanced daily with complaints of LBP/R hip and R knee pain. She reports last fall about a year ago, but feels off-balance daily. She uses a SBQC when out of the house and at night for bathroom visits, and rollator when going to [employer], but at home relies on furniture/walls for support. Pt does not use AFO at home. Pt able to perform sit>stand with 1 UE after instruction for sitting on edge of 17\" high chair with B arm rests and \"nose over toes\". Pt ambulates with flexed trunk, R lateral lean and B genu valgus. Upon reassessment, pt continues to present with limited progress with overall ankle DF and mild change in Tinetti scores and subjective reports in overall balance confidence.  Pt would benefit from continued therapy to further address long-term goals however, progress is slowed due to diagnosis of MS. Current Long Term Goals  1. Pt will demonstrate an increased FOTO score to 50/100 in order to improve function. -9/27: Goal in progress: FOTO= 40 (+4 pt improvement)  2. Pt will be independent with HEP at D/C for self management. - 10/9: Goal in progress. Pt does not perform daily   3. Pt will be able to negotiate 4, 6in stairs with step to gait with 2 railings and without LOB in order to safely enter/exit her home .-9/27: GOAL MET; up/down 4 steps with 1-2 rail modified independently, non-reciprocal gait pattern. 4. Pt will be able to ambulate 5min continuously with use of LLE AFO/SBQC and continuous gait without LOB in order to negotiate her home and community with increased ease.-9/29: GOAL in progress; supervision to ambulate 300' outdoors (see gait)  5. Pt will be able to stand from a 19in surface with use of unilateral UE support on one try in order to improve function -10/9: Goal met. Pt able to stand from 17\" chair with use of 1 UE after instruction in \"nose over toes\" and sitting on edge of chair. 6. Pt will improve TINETTI score to >18/28 in order to decrease risk of future falls- 10/9. Score of 17/28. 1 point improvement from IE.  7. Pt will improve ABC balance score by 10% in order to decrease future risk of falls- 10/9. No change in score from IE. Mobility:  Current  CL= 60-79%   Goal  CK= 40-59%. The severity rating is based on the FOTO Score      New Goals to be achieved in 4 weeks:  Continue unmet goals stated above. RECOMMENDATIONS  Continue with above POC for 2 times a week for 3 weeks to achieve above goals    If you have any questions/comments please contact us directly at [phone]. Thank you for allowing us to assist in the care of your patient.     Therapist Signature: Tiff Lawn Date: 10/9/2017   Therapist Signiture:  Natasha Palm PT, DPT, CMTPT Time: 12:25 PM   NOTE TO PHYSICIAN:  PLEASE COMPLETE THE ORDERS BELOW AND FAX TO   [facility]: [phone]. If you are unable to process this request in 24 hours please contact our office: [phone].    ___ I have read the above report and request that my patient continue as recommended.   ___ I have read the above report and request that my patient continue therapy with the following changes/special instructions:_________________________________________________________   ___ I have read the above report and request that my patient be discharged from therapy.      Physician Signature:        Date:       Time:

## 2017-10-11 ENCOUNTER — HOSPITAL ENCOUNTER (OUTPATIENT)
Dept: PHYSICAL THERAPY | Age: 72
Discharge: HOME | End: 2017-10-11
Payer: MEDICARE

## 2017-10-11 PROCEDURE — 97110 THERAPEUTIC EXERCISES: CPT | Performed by: PHYSICAL THERAPIST

## 2017-10-11 PROCEDURE — 97116 GAIT TRAINING THERAPY: CPT | Performed by: PHYSICAL THERAPIST

## 2017-10-11 PROCEDURE — 97112 NEUROMUSCULAR REEDUCATION: CPT | Performed by: PHYSICAL THERAPIST

## 2017-10-11 NOTE — PROGRESS NOTES
PT DAILY TREATMENT NOTE - [facility]     Patient Name: Attila Chaidez  Date:10/11/2017  : 1945  [x]  Patient  Verified  Payor: Jarett Arellano / Plan: [place] B / Product Type: Medicare /    In time: 2:00  Out time: 255  Total Treatment Time (min): 55  Total Timed Codes (min): 55  1:1 Treatment Time ( only): 55  Visit #: 9 of 10    Treatment Area: Multiple sclerosis [G35]    SUBJECTIVE  Pain Level (0-10 scale): 5/10 right hip/LB and 3/10 R knee  Any medication changes, allergies to medications, adverse drug reactions, diagnosis change, or new procedure performed?: [x] No    [] Yes (see summary sheet for update)  Subjective functional status/changes:   [x] No changes reported    OBJECTIVE      8 min Therapeutic Exercise:  [x] See flow sheet : Recumbent bike, seated hip flex and LAQ. Rationale: increase ROM and increase strength to improve the patients ability to perform functional mobility/ADLs and attain goals. 32 min Neuromuscular Re-education:  [x]  See flow sheet : Tinetti Balance Assessment Tool   Rationale: improve coordination, improve balance and increase proprioception  to improve the patients ability to perform functional mobility/ADLs and attain goals. 15 min Gait Trainin' x 4 for side stepping, hurdles x 4 with 4 reps. Rationale:     min Therapeutic Activity:  Tinetti Balance Assessment Tool & ABC   Rationale: With   [] TE   [] TA   [] neuro   [] other: Patient Education: [x] Review HEP    [] Progressed/Changed HEP based on:   [] positioning   [] body mechanics   [] transfers   [] heat/ice application    [] other:      Other Objective/Functional Measures:   MSR (GT) on Foam EO: 30 sec.  B, increased challenge  noted in L hip with R TB hip 3 way (increased from yellow), UE A for sit to stand [id number] surface    Pain Level (0-10 scale) post treatment: 3/10 right hip/LB and 3/10 R knee    ASSESSMENT/Changes in Function:  Patient progressing with balance activities as she is able to perform MSR on Foam for 30 sec. Continues to be challenges with SL stance to negotiate hurdles as she circumducts L LE    Patient will continue to benefit from skilled PT services to modify and progress therapeutic interventions, address functional mobility deficits, address strength deficits and address imbalance/dizziness to attain remaining goals. []  See Plan of Care  []  See progress note/recertification  []  See Discharge Summary         Progress towards goals / Updated goals: · Short Term Goals: To be accomplished in  2  treatments:  1. Pt will be compliant with HEP for symptom management at home.-9/22: In progress; pt reports compliance.      · Long Term Goals: To be accomplished in  10  treatments:  1. Pt will demonstrate an increased FOTO score to 50/100 in order to improve function. -9/27: Goal in progress: FOTO= 40 (+4 pt improvement)  2. Pt will be independent with HEP at D/C for self management. 3. Pt will be able to negotiate 4, 6in stairs with step to gait with 2 railings and without LOB in order to safely enter/exit her home .-9/27: GOAL MET; up/down 4 steps with 1-2 rail modified independently, non-reciprocal gait pattern. 4. Pt will be able to ambulate 5min continuously with use of LLE AFO/SBQC and continuous gait without LOB in order to negotiate her home and community with increased ease.-9/29: GOAL in progress; supervision to ambulate 300' outdoors (see gait)  5. Pt will be able to stand from a 19in surface with use of unilateral UE support on one try in order to improve function -10/9: Goal met. Pt able to stand from 17\" chair with use of 1 UE after instruction in \"nose over toes\" and sitting on edge of chair. 6. Pt will improve TINETTI score to >18/28 in order to decrease risk of future falls  7.  Pt will improve ABC balance score by 10% in order to decrease future risk of falls     PLAN  [x]  Upgrade activities as tolerated     [x]  Continue plan of care  []  Update interventions per flow sheet       []  Discharge due to:_  []  Other:_      Zeferino Henning, PT 10/11/2017  2:35 PM    Future Appointments  Date Time Provider Cecily Goins   10/16/2017 3:00 PM Kendall Rowley, DPT [facility]   10/23/2017 2:30 PM Kendall Rowley, JENNIT [facility]   10/27/2017 2:30 PM Kendall Rowley, JENNIT [facility]   10/30/2017 2:30 PM JENNI VigilT [facility]

## 2017-10-13 ENCOUNTER — APPOINTMENT (OUTPATIENT)
Dept: PHYSICAL THERAPY | Age: 72
End: 2017-10-13
Payer: MEDICARE

## 2017-10-23 ENCOUNTER — APPOINTMENT (OUTPATIENT)
Dept: PHYSICAL THERAPY | Age: 72
End: 2017-10-23
Payer: MEDICARE

## 2017-10-24 ENCOUNTER — HOSPITAL ENCOUNTER (OUTPATIENT)
Dept: PHYSICAL THERAPY | Age: 72
Discharge: HOME | End: 2017-10-24
Payer: MEDICARE

## 2017-10-24 PROCEDURE — 97110 THERAPEUTIC EXERCISES: CPT

## 2017-10-24 PROCEDURE — 97116 GAIT TRAINING THERAPY: CPT

## 2017-10-24 NOTE — PROGRESS NOTES
PT DAILY TREATMENT NOTE - [facility]     Patient Name: Ana Grover  Date:10/24/2017  : 1945  [x]  Patient  Verified  Payor: Haresh Drafts / Plan: [place] MED[place]ARE PART B / Product Type: Medicare /    In time: 4:05  Out time: 4:55  Total Treatment Time (min): 50  Total Timed Codes (min): 50  1:1 Treatment Time ( [address] only): 50  Visit #: 2 of 10 (11)  Treatment Area: Multiple sclerosis [G35]    SUBJECTIVE  Pain Level (0-10 scale): 0/10  Any medication changes, allergies to medications, adverse drug reactions, diagnosis change, or new procedure performed?: [x] No    [] Yes (see summary sheet for update)  Subjective functional status/changes:   [x] No changes reported    OBJECTIVE      25 min Therapeutic Exercise:  [x] See flow sheet : Sit<>Stand, Hip 3 Way, Step-Ups   Rationale: increase ROM and increase strength to improve the patients ability to perform functional mobility/ADLs and attain goals. 5 min Neuromuscular Re-education:  [x]  See flow sheet : SLS, Foam Marches   Rationale: improve coordination, improve balance and increase proprioception  to improve the patients ability to perform functional mobility/ADLs and attain goals. 10 min Gait Trainin' x 4 for side stepping, tandem walking, and walking with head turns. Rationale:    -10 min NC for frequent breaks due to elevated temperature in the clinic          With   [] TE   [] TA   [] neuro   [] other: Patient Education: [x] Review HEP    [] Progressed/Changed HEP based on:   [] positioning   [] body mechanics   [] transfers   [] heat/ice application    [] other:      Other Objective/Functional Measures:      Pain Level (0-10 scale) post treatment: 0  -pain noted in R knee when support for hip 3 way, thus abbreviated   -dec use of UE to perform sit<>stand from 20 in surface. ASSESSMENT/Changes in Function: Pt required rest breaks today due to warm temperature in the clinic, however pt able to complete therex with no adverse effects. Pt was able to complete sit<>stand from 20in surface with decreased use of UE to push off and control descent. Pt required only occasional use of UE to push up from sitting. Continue POC per pt tolerance. Patient will continue to benefit from skilled PT services to modify and progress therapeutic interventions, address functional mobility deficits, address strength deficits and address imbalance/dizziness to attain remaining goals. []  See Plan of Care  []  See progress note/recertification  []  See Discharge Summary         Progress towards goals / Updated goals: · Short Term Goals: To be accomplished in  2  treatments:  1. Pt will be compliant with HEP for symptom management at home.-9/22: In progress; pt reports compliance.      · Long Term Goals: To be accomplished in  10  treatments:  1. Pt will demonstrate an increased FOTO score to 50/100 in order to improve function. -9/27: Goal in progress: FOTO= 40 (+4 pt improvement)  2. Pt will be independent with HEP at D/C for self management. 3. Pt will be able to negotiate 4, 6in stairs with step to gait with 2 railings and without LOB in order to safely enter/exit her home .-9/27: GOAL MET; up/down 4 steps with 1-2 rail modified independently, non-reciprocal gait pattern. 4. Pt will be able to ambulate 5min continuously with use of LLE AFO/SBQC and continuous gait without LOB in order to negotiate her home and community with increased ease.-9/29: GOAL in progress; supervision to ambulate 300' outdoors (see gait)  5. Pt will be able to stand from a 19in surface with use of unilateral UE support on one try in order to improve function -10/9: Goal met. Pt able to stand from 17\" chair with use of 1 UE after instruction in \"nose over toes\" and sitting on edge of chair. 6. Pt will improve TINETTI score to >18/28 in order to decrease risk of future falls  7.  Pt will improve ABC balance score by 10% in order to decrease future risk of falls     PLAN  [x] Upgrade activities as tolerated     [x]  Continue plan of care  []  Update interventions per flow sheet       []  Discharge due to:_  []  Other:_      Telma Wetzel, PT, DPT  10/24/2017      Future Appointments  Date Time Provider Cecily Goins   10/27/2017 2:30 PM Austin Fast, PTA [facility]   10/30/2017 2:30 PM Austin Fast, PTA [facility]

## 2017-10-27 ENCOUNTER — HOSPITAL ENCOUNTER (OUTPATIENT)
Dept: PHYSICAL THERAPY | Age: 72
Discharge: HOME | End: 2017-10-27
Payer: MEDICARE

## 2017-10-27 PROCEDURE — 97112 NEUROMUSCULAR REEDUCATION: CPT

## 2017-10-27 PROCEDURE — 97110 THERAPEUTIC EXERCISES: CPT

## 2017-10-27 NOTE — PROGRESS NOTES
PT DAILY TREATMENT NOTE - [facility]     Patient Name: Jenny Lockhart  Date:10/27/2017  : 1945  [x]  Patient  Verified  Payor: Azucena Franco / Plan: [place] MEDICARE PART B / Product Type: Medicare /    In time: 2:30  Out time: 3:05  Total Treatment Time (min): 35  Total Timed Codes (min): 35  1:1 Treatment Time ( [address] only): 35  Visit #: 3 of 10 (12)  Treatment Area: Other abnormalities of gait and mobility [R26.89]  Multiple sclerosis [G35]    SUBJECTIVE  Pain Level (0-10 scale): 0/10  Any medication changes, allergies to medications, adverse drug reactions, diagnosis change, or new procedure performed?: [x] No    [] Yes (see summary sheet for update)  Subjective functional status/changes:   [x] No changes reported  It is really warm in here, so I don't know how well I will do today    OBJECTIVE      20 min Therapeutic Exercise:  [x] See flow sheet :    Rationale: increase ROM and increase strength to improve the patients ability to perform functional mobility/ADLs and attain goals. 5 min Neuromuscular Re-education:  [x]  See flow sheet : SLS, Foam Marches   Rationale: improve coordination, improve balance and increase proprioception  to improve the patients ability to perform functional mobility/ADLs and attain goals. 10 min Gait Training:  Outside ambulation and stepping over hurdles outside over uneven surfaces    Rationale:           With   [] TE   [] TA   [] neuro   [] other: Patient Education: [x] Review HEP    [] Progressed/Changed HEP based on:   [] positioning   [] body mechanics   [] transfers   [] heat/ice application    [] other:      Other Objective/Functional Measures:  Patient was able to tolerate outside ambulation and stepping over hurdles with SBA/CGA with good tolerance secondary to the temperature outside was lower than in the clinic -patient was able to perform instep on foam with EO and rhomberg on foam with EC with some swaying noted however was able to maintain for 30 sec each     Pain Level (0-10 scale) post treatment: 0  -  ASSESSMENT/Changes in Function:    Patient will continue to benefit from skilled PT services to modify and progress therapeutic interventions, address functional mobility deficits, address strength deficits and address imbalance/dizziness to attain remaining goals. []  See Plan of Care  []  See progress note/recertification  []  See Discharge Summary         Progress towards goals / Updated goals: · Short Term Goals: To be accomplished in  2  treatments:  1. Pt will be compliant with HEP for symptom management at home.-9/22: In progress; pt reports compliance.      · Long Term Goals: To be accomplished in  10  treatments:  1. Pt will demonstrate an increased FOTO score to 50/100 in order to improve function. -9/27: Goal in progress: FOTO= 40 (+4 pt improvement)  2. Pt will be independent with HEP at D/C for self management. 3. Pt will be able to negotiate 4, 6in stairs with step to gait with 2 railings and without LOB in order to safely enter/exit her home .-9/27: GOAL MET; up/down 4 steps with 1-2 rail modified independently, non-reciprocal gait pattern. 4. Pt will be able to ambulate 5min continuously with use of LLE AFO/SBQC and continuous gait without LOB in order to negotiate her home and community with increased ease.-9/29: GOAL in progress; supervision to ambulate 300' outdoors (see gait)  5. Pt will be able to stand from a 19in surface with use of unilateral UE support on one try in order to improve function -10/9: Goal met. Pt able to stand from 17\" chair with use of 1 UE after instruction in \"nose over toes\" and sitting on edge of chair. 6. Pt will improve TINETTI score to >18/28 in order to decrease risk of future falls  7.  Pt will improve ABC balance score by 10% in order to decrease future risk of falls     PLAN  [x]  Upgrade activities as tolerated     [x]  Continue plan of care  []  Update interventions per flow sheet       []  Discharge due to:_  []  Other:_      Heather Malik PTA,   10/27/2017      Future Appointments  Date Time Provider Cecily Goins   10/30/2017 2:30 PM Heather Malik PTA [facility]

## 2017-10-30 ENCOUNTER — HOSPITAL ENCOUNTER (OUTPATIENT)
Dept: PHYSICAL THERAPY | Age: 72
Discharge: HOME | End: 2017-10-30
Payer: MEDICARE

## 2017-10-30 PROCEDURE — 97110 THERAPEUTIC EXERCISES: CPT

## 2017-10-30 PROCEDURE — 97112 NEUROMUSCULAR REEDUCATION: CPT

## 2017-10-30 NOTE — PROGRESS NOTES
PT DAILY TREATMENT NOTE - [facility]     Patient Name: Nu De La Torre  Date:10/30/2017  : 1945  [x]  Patient  Verified  Payor: Linder Cheadle / Plan: [place] MEDICARE PART B / Product Type: Medicare /    In time: 2:30  Out time: 3:23  Total Treatment Time (min): 53  Total Timed Codes (min):53  1:1 Treatment Time ( [address] only): 53   Visit #: 4 of 10 (12)  Treatment Area: Other abnormalities of gait and mobility [R26.89]  Multiple sclerosis [G35]    SUBJECTIVE  Pain Level (0-10 scale): 0/10  Any medication changes, allergies to medications, adverse drug reactions, diagnosis change, or new procedure performed?: [x] No    [] Yes (see summary sheet for update)  Subjective functional status/changes:   [x] No changes reported  I am doing better today     OBJECTIVE      33 min Therapeutic Exercise:  [x] See flow sheet :    Rationale: increase ROM and increase strength to improve the patients ability to perform functional mobility/ADLs and attain goals. 15 min Neuromuscular Re-education:  [x]  See flow sheet : SLS, Foam Marches   Rationale: improve coordination, improve balance and increase proprioception  to improve the patients ability to perform functional mobility/ADLs and attain goals. 5 min Gait Training:  Outside ambulation    Rationale:           With   [] TE   [] TA   [] neuro   [] other: Patient Education: [x] Review HEP    [] Progressed/Changed HEP based on:   [] positioning   [] body mechanics   [] transfers   [] heat/ice application    [] other:      Other Objective/Functional Measures:  Continue with POC towards improving Tinetti score and to decrease risk of fall -patient was able to perform all exercises better today secondary to temperature in the clinic was cooler     Pain Level (0-10 scale) post treatment: 0  -  ASSESSMENT/Changes in Function:    Patient will continue to benefit from skilled PT services to modify and progress therapeutic interventions, address functional mobility deficits, address strength deficits and address imbalance/dizziness to attain remaining goals. []  See Plan of Care  []  See progress note/recertification  []  See Discharge Summary         Progress towards goals / Updated goals: · Short Term Goals: To be accomplished in  2  treatments:  1. Pt will be compliant with HEP for symptom management at home.-9/22: In progress; pt reports compliance.      · Long Term Goals: To be accomplished in  10  treatments:  1. Pt will demonstrate an increased FOTO score to 50/100 in order to improve function. -9/27: Goal in progress: FOTO= 40 (+4 pt improvement)  2. Pt will be independent with HEP at D/C for self management. 3. Pt will be able to negotiate 4, 6in stairs with step to gait with 2 railings and without LOB in order to safely enter/exit her home .-9/27: GOAL MET; up/down 4 steps with 1-2 rail modified independently, non-reciprocal gait pattern. 4. Pt will be able to ambulate 5min continuously with use of LLE AFO/SBQC and continuous gait without LOB in order to negotiate her home and community with increased ease.-9/29: GOAL in progress; supervision to ambulate 300' outdoors (see gait)  5. Pt will be able to stand from a 19in surface with use of unilateral UE support on one try in order to improve function -10/9: Goal met. Pt able to stand from 17\" chair with use of 1 UE after instruction in \"nose over toes\" and sitting on edge of chair. 6. Pt will improve TINETTI score to >18/28 in order to decrease risk of future falls  7. Pt will improve ABC balance score by 10% in order to decrease future risk of falls     PLAN  [x]  Upgrade activities as tolerated     [x]  Continue plan of care  []  Update interventions per flow sheet       []  Discharge due to:_  []  Other:_      Donnie Pittman, AYANA,   10/30/2017      No future appointments.

## 2017-11-08 ENCOUNTER — HOSPITAL ENCOUNTER (OUTPATIENT)
Dept: PHYSICAL THERAPY | Age: 72
Discharge: HOME | End: 2017-11-08
Payer: MEDICARE

## 2017-11-08 PROCEDURE — 97112 NEUROMUSCULAR REEDUCATION: CPT

## 2017-11-08 PROCEDURE — 97110 THERAPEUTIC EXERCISES: CPT

## 2017-11-08 NOTE — PROGRESS NOTES
PT DAILY TREATMENT NOTE - [facility]     Patient Name: Alyssa Wilder  Date:2017  : 1945  [x]  Patient  Verified  Payor: Maryanne Eckert / Plan: [place] B / Product Type: Medicare /    In time: 4:35  Out time: 5:05  Total Treatment Time (min): 30  Total Timed Codes (min)30  1:1 Treatment Time ( [address] only):  30  Visit #: 5 of 10 (12)  Treatment Area: Other abnormalities of gait and mobility [R26.89]  Multiple sclerosis [G35]    SUBJECTIVE  Pain Level (0-10 scale): 0/10  Any medication changes, allergies to medications, adverse drug reactions, diagnosis change, or new procedure performed?: [x] No    [] Yes (see summary sheet for update)  Subjective functional status/changes:   [x] No changes reported  I am not doing good today - I feel off all day and my knee is not helping the problem either     OBJECTIVE      15 min Therapeutic Exercise:  [x] See flow sheet :    Rationale: increase ROM and increase strength to improve the patients ability to perform functional mobility/ADLs and attain goals. 15 min Neuromuscular Re-education:  [x]  See flow sheet :  EC firm - RHOM 30  EC firm - instep (R)                              (L) 3030  EO - firm - sharp (L)                                (R) 30  EC - foam - RHOM -      Rationale: improve coordination, improve balance and increase proprioception  to improve the patients ability to perform functional mobility/ADLs and attain goals. min Gait Training:  Unable secondary raining    Rationale:           With   [] TE   [] TA   [] neuro   [] other: Patient Education: [x] Review HEP    [] Progressed/Changed HEP based on:   [] positioning   [] body mechanics   [] transfers   [] heat/ice application    [] other:      Other Objective/Functional Measures:  Decrease balance noted today with balance activities         Did not reassess secondary to this issue - will reassess next visit     Pain Level (0-10 scale) post treatment: 0  -  ASSESSMENT/Changes in Function:    Patient will continue to benefit from skilled PT services to modify and progress therapeutic interventions, address functional mobility deficits, address strength deficits and address imbalance/dizziness to attain remaining goals. []  See Plan of Care  []  See progress note/recertification  []  See Discharge Summary         Progress towards goals / Updated goals: · Short Term Goals: To be accomplished in  2  treatments:  1. Pt will be compliant with HEP for symptom management at home.-9/22: In progress; pt reports compliance.      · Long Term Goals: To be accomplished in  10  treatments:  1. Pt will demonstrate an increased FOTO score to 50/100 in order to improve function. -9/27: Goal in progress: FOTO= 40 (+4 pt improvement)  2. Pt will be independent with HEP at D/C for self management. 3. Pt will be able to negotiate 4, 6in stairs with step to gait with 2 railings and without LOB in order to safely enter/exit her home .-9/27: GOAL MET; up/down 4 steps with 1-2 rail modified independently, non-reciprocal gait pattern. 4. Pt will be able to ambulate 5min continuously with use of LLE AFO/SBQC and continuous gait without LOB in order to negotiate her home and community with increased ease.-9/29: GOAL in progress; supervision to ambulate 300' outdoors (see gait)  5. Pt will be able to stand from a 19in surface with use of unilateral UE support on one try in order to improve function -10/9: Goal met. Pt able to stand from 17\" chair with use of 1 UE after instruction in \"nose over toes\" and sitting on edge of chair. 6. Pt will improve TINETTI score to >18/28 in order to decrease risk of future falls  7.  Pt will improve ABC balance score by 10% in order to decrease future risk of falls     PLAN  [x]  Upgrade activities as tolerated     [x]  Continue plan of care  []  Update interventions per flow sheet       []  Discharge due to:_  []  Other:_      Jon Decker, PTA, 11/8/2017      Future Appointments  Date Time Provider Cecily Briseida   11/14/2017 11:30 AM Tracy Tapia PTA [facility]   11/17/2017 1:30 PM Grace Henson PT [facility]

## 2017-11-14 ENCOUNTER — APPOINTMENT (OUTPATIENT)
Dept: PHYSICAL THERAPY | Age: 72
End: 2017-11-14
Payer: MEDICARE

## 2017-11-17 ENCOUNTER — HOSPITAL ENCOUNTER (OUTPATIENT)
Dept: PHYSICAL THERAPY | Age: 72
Discharge: HOME | End: 2017-11-17
Payer: MEDICARE

## 2017-11-17 PROCEDURE — G8978 MOBILITY CURRENT STATUS: HCPCS | Performed by: PHYSICAL THERAPIST

## 2017-11-17 PROCEDURE — 97530 THERAPEUTIC ACTIVITIES: CPT | Performed by: PHYSICAL THERAPIST

## 2017-11-17 PROCEDURE — G8979 MOBILITY GOAL STATUS: HCPCS | Performed by: PHYSICAL THERAPIST

## 2017-11-17 NOTE — PROGRESS NOTES
[address] [facility]ERAPY AT [facility]  [address] [place] [address],[address], Rayna almanza, April Mercado  Phone [phone]  Fax [phone]  PROGRESS NOTE  Patient Name: Niesha Saravia : 1945   Treatment/Medical Diagnosis: Other abnormalities of gait and mobility [R26.89]  Multiple sclerosis [G35]   Referral Source: Romain Shukla MD     Date of Initial Visit: 17 Attended Visits: 15 Missed Visits: 1     SUMMARY OF TREATMENT  Patient has received physical therapy for L UE/LE weakness and falls since 17. Treatment has included therapeutic stretching, strengthen, balance activities, and gait training to assist with decreasing pain and increasing function. CURRENT STATUS    Subjective: Patient reports 33% improvements in sx since [facility]. Pain levels : 2-3 to 9(brief). . She reports pain in all jts. And is using pain patches everyday. \" At home, I use the furniture and walls for balance, I don't walk across open spaces, that has not changed. \"  Patient reports she stumbles a few times a week but is able to catch herself and has not fallen. Objective: Patient able to walk about 1.5 hrs while holding cart for shopping trips, Patient has completed 15 visits  Patient reports no significant improvement since beginning therapy  FOTO (Functional Status Summary)  score is 40/100 (on 17) and was 36/100 initial evaluation - indication of overall functional improvement. Tinetti: 18- high risk of falls (17) at eval: 1628 17  Activities- specific balance confidence scale: 37.1% 17 at eval: 26%  Pt does not use AFO at home. Pt able to perform sit>stand with 1 UE after instruction for sitting on edge of 17\" high chair with B arm rests and \"nose over toes\". Pt ambulates with flexed trunk, R lateral lean and B genu valgus. Pt would benefit from continued therapy to further address long-term goals however, progress is slowed due to diagnosis of MS.    Improvements: getting up/down out of chair easier, Pt verbalizes more confidence with balance- last fall was at the end of summer. Deficits: unable to get up from floor, walking without AD out of house, prolonged walking, stairs with step to gait, patient reports she hasn't been upstairs in months. FOTO: 40    Patient goal: Patient reports she would like to feel more confident to walk in center of rooms. · Long Term Goals: To be accomplished in  10  treatments:  1. Pt will demonstrate an increased FOTO score to 50/100 in order to improve function. 11-17Goal in progress: FOTO= 40 no improvement from last score  2. Pt will be independent with HEP at D/C for self management. Patient reports she performs HEP 1-2/day 11-17-17  3. Pt will be able to negotiate 4, 6in stairs with step to gait with 2 railings and without LOB in order to safely enter/exit her home . 11-17   GOAL MET; up/down 4 steps with 1-2 rail modified independently, non-reciprocal gait pattern. 4. Pt will be able to ambulate 5min continuously with use of LLE AFO/SBQC and continuous gait without LOB in order to negotiate her home and community with increased ease. 11-17-17 GOAL in progress; supervision to ambulate 300' outdoors using rollator. 5. Pt will be able to stand from a 19in surface with use of unilateral UE support on one try in order to improve function  Goal met. Pt able to stand from 19\" chair with use of 1 UE after instruction in \"nose over toes\" and sitting on edge of chair for 5 trials. 11-17  6. Pt will improve TINETTI score to >18/28 in order to decrease risk of future falls 18 Goal MET 11-17  7. Pt will improve ABC balance score by 10% in order to decrease future risk of falls 11-17-17 MET Increased from 26% to 37%    Mobility:  Current  CL= 60-79%   Goal  CK= 40-59%. The severity rating is based on the FOTO Score    New Goals to be achieved in 4 weeks:  Continue unmet goals stated above.    1. Pt will be able to ambulate 5min continuously with use of LLE AFO/SBQC and continuous gait without LOB in order to negotiate her home and community with increased ease. 2. . Pt will demonstrate an increased FOTO score to 50/100 in order to improve function. 3. Patient to report 75% confidence in walking across the center of a room with quad cane away form walls. RECOMMENDATIONS  Continue with above POC for 2 times a week for 3 weeks to achieve above goals    If you have any questions/comments please contact us directly at [phone]. Thank you for allowing us to assist in the care of your patient. Therapist Signature: Elvis Tsang PT Date: 11/17/2017   Therapist Signiture:   Time: 12:25 PM   NOTE TO PHYSICIAN:  PLEASE COMPLETE THE ORDERS BELOW AND FAX TO   [facility]: [phone]. If you are unable to process this request in 24 hours please contact our office: [phone].    ___ I have read the above report and request that my patient continue as recommended.   ___ I have read the above report and request that my patient continue therapy with the following changes/special instructions:_________________________________________________________   ___ I have read the above report and request that my patient be discharged from therapy.      Physician Signature:        Date:       Time:

## 2017-11-17 NOTE — PROGRESS NOTES
PT DAILY TREATMENT NOTE - [facility]     Patient Name: Dennis Brooks  Date:2017  : 1945  [x]  Patient  Verified  Payor: Fanta Reyes / Plan: [place] MEDICARE PART B / Product Type: Medicare /    In time: 3905  Out time: 1215pm  Total Treatment Time (min): 45  Total Timed Codes (min)30  1:1 Treatment Time ( [address] only):  40  Visit #: 6 of 10 (12)  Treatment Area: Other abnormalities of gait and mobility [R26.89]  Multiple sclerosis [G35]    SUBJECTIVE  Pain Level (0-10 scale): 0/10  Any medication changes, allergies to medications, adverse drug reactions, diagnosis change, or new procedure performed?: [x] No    [] Yes (see summary sheet for update)  Subjective functional status/changes:   [x] No changes reported  See PN    OBJECTIVE      45 min Therapeutic Exercise:  [x] See flow sheet : reassessment performed, Tinetti, ABC scale and FOTO   Rationale: increase ROM and increase strength to improve the patients ability to perform functional mobility/ADLs and attain goals. min Neuromuscular Re-education:  [x]  See flow sheet :  EC firm - RHOM 30  EC firm - instep (R) 1130                             (L) 30/30  EO - firm - sharp (L) 1330                               (R) 8/30  EC - foam - RHOM -      Rationale: improve coordination, improve balance and increase proprioception  to improve the patients ability to perform functional mobility/ADLs and attain goals. min Gait Training:  Unable secondary raining    Rationale: With   [] TE   [] TA   [] neuro   [] other: Patient Education: [x] Review HEP    [] Progressed/Changed HEP based on:   [] positioning   [] body mechanics   [] transfers   [] heat/ice application    [] other:      Other Objective/Functional Measures:    Subjective: Patient reports 33% improvements in sx since [facility]. Pain levels : 2-3 to 9(brief). . She reports pain in all jts. And is using pain patches everyday.  \" At home, I use the furniture and walls for balance, I don't walk across open spaces, that has not changed. \"  Patient reports she stumbles a few times a week but is able to catch herself and has not fallen. Objective: Patient able to walk about 1.5 hrs while holding cart for shopping trips,   Improvements: getting up/down out of chair easier, Pt verbalizes more confidence with balance- last fall was at the end of summer. Deficits: unable to get up from floor, walking without AD out of house, prolonged walking, stairs with step to gait, patient reports she hasn't been upstairs in months. FOTO: 40  Patient goal: Patient reports she would like to feel more confident to walk in center of rooms. Pain Level (0-10 scale) post treatment: 0  -  ASSESSMENT/Changes in Function: see PN    Patient will continue to benefit from skilled PT services to modify and progress therapeutic interventions, address functional mobility deficits, address strength deficits and address imbalance/dizziness to attain remaining goals. []  See Plan of Care  []  See progress note/recertification  []  See Discharge Summary         Progress towards goals / Updated goals: · Short Term Goals: To be accomplished in  2  treatments:  1. Pt will be compliant with HEP for symptom management at home. 11-17-17: In progress; pt reports compliance.      · Long Term Goals: To be accomplished in  10  treatments:  1. Pt will demonstrate an increased FOTO score to 50/100 in order to improve function. 11-17Goal in progress: FOTO= 40 no improvement from last score  2. Pt will be independent with HEP at D/C for self management. Patient reports she performs HEP 1-2/day 11-17-17  3. Pt will be able to negotiate 4, 6in stairs with step to gait with 2 railings and without LOB in order to safely enter/exit her home . 11-17   GOAL MET; up/down 4 steps with 1-2 rail modified independently, non-reciprocal gait pattern.   4. Pt will be able to ambulate 5min continuously with use of LLE AFO/SBQC and continuous gait without LOB in order to negotiate her home and community with increased ease. 11-17-17 GOAL in progress; supervision to ambulate 300' outdoors using rollator. 5. Pt will be able to stand from a 19in surface with use of unilateral UE support on one try in order to improve function  Goal met. Pt able to stand from 19\" chair with use of 1 UE after instruction in \"nose over toes\" and sitting on edge of chair for 5 trials. 11-17  6. Pt will improve TINETTI score to >18/28 in order to decrease risk of future falls 18 Goal MET 11-17  7. Pt will improve ABC balance score by 10% in order to decrease future risk of falls 11-17-17 MET Increased from 26% to 37%    PLAN  [x]  Upgrade activities as tolerated     [x]  Continue plan of care  []  Update interventions per flow sheet       []  Discharge due to:_  []  Other:_      Isabella Quinones, PT,   11/17/2017      No future appointments.

## 2017-11-20 ENCOUNTER — HOSPITAL ENCOUNTER (OUTPATIENT)
Dept: PHYSICAL THERAPY | Age: 72
Discharge: HOME | End: 2017-11-20
Payer: MEDICARE

## 2017-11-20 PROCEDURE — G8979 MOBILITY GOAL STATUS: HCPCS | Performed by: PHYSICAL THERAPIST

## 2017-11-20 PROCEDURE — G8980 MOBILITY D/C STATUS: HCPCS | Performed by: PHYSICAL THERAPIST

## 2017-11-20 PROCEDURE — 97110 THERAPEUTIC EXERCISES: CPT

## 2017-11-20 NOTE — PROGRESS NOTES
PT DAILY TREATMENT NOTE - [facility]     Patient Name: Johnson Cordero  Date:2017  : 1945  [x]  Patient  Verified  Payor: Tiff Draper / Plan: [place] MEDICARE PART B / Product Type: Medicare /    In time: 2:35Out time: 2:55  Total Treatment Time (min): 20  Total Timed Codes (min)20  1:1 Treatment Time ( [address] only):  20  Visit #: 1 of 10   Treatment Area: Other abnormalities of gait and mobility [R26.89]  Multiple sclerosis [G35]    SUBJECTIVE  Pain Level (0-10 scale): 0/10  Any medication changes, allergies to medications, adverse drug reactions, diagnosis change, or new procedure performed?: [x] No    [] Yes (see summary sheet for update)  Subjective functional status/changes:   [x] No changes reported  I don't think that anymore therapy will help me - maybe I should be done today    OBJECTIVE      20 min Therapeutic Exercise:  [x] See flow sheet : created an update HEP     Rationale: increase ROM and increase strength to improve the patients ability to perform functional mobility/ADLs and attain goals. min Neuromuscular Re-education:  [x]  See flow sheet :       Rationale: improve coordination, improve balance and increase proprioception  to improve the patients ability to perform functional mobility/ADLs and attain goals. min Gait Training:  Unable secondary raining    Rationale:           With   [] TE   [] TA   [] neuro   [] other: Patient Education: [x] Review HEP    [] Progressed/Changed HEP based on:   [] positioning   [] body mechanics   [] transfers   [] heat/ice application    [] other:      Other Objective/Functional Measures:  Max benefit achieved with therapy - patient request to be d/c to a home exercise and balance program     Pain Level (0-10 scale) post treatment: 0  -  ASSESSMENT/Changes in Function:    Patient will continue to benefit from skilled PT services to modify and progress therapeutic interventions, address functional mobility deficits, address strength deficits and address imbalance/dizziness to attain remaining goals. []  See Plan of Care  []  See progress note/recertification  [x]  See Discharge Summary         Progress towards goals / Updated goals:       PLAN  [x]  Upgrade activities as tolerated     []  Continue plan of care  []  Update interventions per flow sheet       [x]  Discharge due to:_MAX BENEFIT ACHIEVED  []  Other:_      Kodak Appiah, PTA,   11/20/2017      No future appointments.

## 2017-11-27 NOTE — PROGRESS NOTES
[address] [facility]ICAL THERAPY AT [facility]  [address] [place] [address],[address], April Aaron  Phone [phone]  Fax [phone]  DISCHARGE SUMMARY FOR PHYSICAL THERAPY          Patient Name: Moriah Perkins : 1945   Treatment/Medical Diagnosis: Other abnormalities of gait and mobility [R26.89]  Multiple sclerosis [G35]   Onset Date:     Referral Source: Inez Rubio MD [facility] of [facility]): 2017   Prior Hospitalization: See Medical History Provider #: [id number]   Prior Level of Function: Able to get off a chair easier; able to ambulate more easily with use of AFO and SBQC   Comorbidities: MS, latex allergy; OA depression CTS and resting hand splint on right hand    Medications: Verified on Patient Summary List   Visits from [facility]: 16 Missed Visits: 2       Summary of Care: Thank you for referring this pleasant patient. Caterina Cooper has completed 16 of 16 proposed sessions and has met or exceeded 5 of 7 of long term goals. She reports 33% improvement of symptoms with ADLs   See below for current status - patient was reassessed on 2017 and then arrived on 2017 and reported increase pain in the (R) knee limiting her from continue with therapy. Patient seems to have return to her prior level of function    Patient is ready to assume I management of home exercise program to assist with keeping pain level down and decrease change of reoccurence of initial symptoms. Will advise patient that is any additional follow up or recommendation is needed to return to referring physician. Key Functional Changes/Progress: Patient goal: Patient reports she would like to feel more confident to walk in center of rooms. ·   · Long Term Goals: To be accomplished in  10  treatments:  1. Pt will demonstrate an increased FOTO score to 50/100 in order to improve function. -Goal in progress: FOTO= 40 no improvement from last score  2.  Pt will be independent with HEP at D/C for self management. Patient reports she performs HEP 1-2/day 11-17-17  3. Pt will be able to negotiate 4, 6in stairs with step to gait with 2 railings and without LOB in order to safely enter/exit her home . 11-17   GOAL MET; up/down 4 steps with 1-2 rail modified independently, non-reciprocal gait pattern. 4. Pt will be able to ambulate 5min continuously with use of LLE AFO/SBQC and continuous gait without LOB in order to negotiate her home and community with increased ease. 11-17-17 GOAL in progress; supervision to ambulate 300' outdoors using rollator. 5. Pt will be able to stand from a 19in surface with use of unilateral UE support on one try in order to improve function  Goal met. Pt able to stand from 19\" chair with use of 1 UE after instruction in \"nose over toes\" and sitting on edge of chair for 5 trials. 11-17  6. Pt will improve TINETTI score to >18/28 in order to decrease risk of future falls 18 Goal MET 11-17  7. Pt will improve ABC balance score by 10% in order to decrease future risk of falls 11-17-17 MET Increased from 26% to 37%    G-Codes (GP): Mobility: Mobility:   Goal  CK= 40-59%  D/C  CJ= 20-39%. The severity rating is based on the FOTO Score    Assessments/Recommendations: Discontinue therapy. Progressing towards or have reached established goals. If you have any questions/comments please contact us directly at [phone]. Thank you for allowing us to assist in the care of your patient.         Therapist Signature: Omar Trejo PTA Date: 11/27/2017     Therapist Mann Allen DPT  Time: 7:03 AM    Reporting Period    9/5/17-11/20/17